# Patient Record
Sex: MALE | Race: BLACK OR AFRICAN AMERICAN | NOT HISPANIC OR LATINO | Employment: FULL TIME | URBAN - METROPOLITAN AREA
[De-identification: names, ages, dates, MRNs, and addresses within clinical notes are randomized per-mention and may not be internally consistent; named-entity substitution may affect disease eponyms.]

---

## 2021-03-15 ENCOUNTER — OFFICE VISIT (OUTPATIENT)
Dept: URGENT CARE | Facility: CLINIC | Age: 26
End: 2021-03-15
Payer: MEDICAID

## 2021-03-15 ENCOUNTER — CLINICAL SUPPORT (OUTPATIENT)
Dept: URGENT CARE | Facility: CLINIC | Age: 26
End: 2021-03-15
Payer: MEDICAID

## 2021-03-15 VITALS
BODY MASS INDEX: 22.53 KG/M2 | HEART RATE: 71 BPM | TEMPERATURE: 99 F | DIASTOLIC BLOOD PRESSURE: 53 MMHG | RESPIRATION RATE: 16 BRPM | HEIGHT: 73 IN | SYSTOLIC BLOOD PRESSURE: 127 MMHG | WEIGHT: 170 LBS | OXYGEN SATURATION: 96 %

## 2021-03-15 DIAGNOSIS — K52.9 GASTROENTERITIS: Primary | ICD-10-CM

## 2021-03-15 DIAGNOSIS — R11.2 NON-INTRACTABLE VOMITING WITH NAUSEA, UNSPECIFIED VOMITING TYPE: ICD-10-CM

## 2021-03-15 DIAGNOSIS — Z11.9 ENCOUNTER FOR SCREENING EXAMINATION FOR INFECTIOUS DISEASE: Primary | ICD-10-CM

## 2021-03-15 DIAGNOSIS — R19.7 DIARRHEA, UNSPECIFIED TYPE: ICD-10-CM

## 2021-03-15 LAB
CTP QC/QA: YES
SARS-COV-2 RDRP RESP QL NAA+PROBE: NEGATIVE

## 2021-03-15 PROCEDURE — U0002 COVID-19 LAB TEST NON-CDC: HCPCS | Mod: QW,S$GLB,, | Performed by: PHYSICIAN ASSISTANT

## 2021-03-15 PROCEDURE — 99203 PR OFFICE/OUTPT VISIT, NEW, LEVL III, 30-44 MIN: ICD-10-PCS | Mod: S$GLB,,, | Performed by: PHYSICIAN ASSISTANT

## 2021-03-15 PROCEDURE — U0002: ICD-10-PCS | Mod: QW,S$GLB,, | Performed by: PHYSICIAN ASSISTANT

## 2021-03-15 PROCEDURE — 99203 OFFICE O/P NEW LOW 30 MIN: CPT | Mod: S$GLB,,, | Performed by: PHYSICIAN ASSISTANT

## 2021-04-16 ENCOUNTER — PATIENT MESSAGE (OUTPATIENT)
Dept: RESEARCH | Facility: HOSPITAL | Age: 26
End: 2021-04-16

## 2021-07-01 ENCOUNTER — PATIENT MESSAGE (OUTPATIENT)
Dept: ADMINISTRATIVE | Facility: OTHER | Age: 26
End: 2021-07-01

## 2021-12-08 ENCOUNTER — OFFICE VISIT (OUTPATIENT)
Dept: URGENT CARE | Facility: CLINIC | Age: 26
End: 2021-12-08
Payer: COMMERCIAL

## 2021-12-08 VITALS
RESPIRATION RATE: 20 BRPM | WEIGHT: 170 LBS | HEART RATE: 81 BPM | TEMPERATURE: 99 F | BODY MASS INDEX: 22.53 KG/M2 | SYSTOLIC BLOOD PRESSURE: 145 MMHG | HEIGHT: 73 IN | OXYGEN SATURATION: 98 % | DIASTOLIC BLOOD PRESSURE: 87 MMHG

## 2021-12-08 DIAGNOSIS — J02.9 SORE THROAT: Primary | ICD-10-CM

## 2021-12-08 DIAGNOSIS — B34.9 VIRAL SYNDROME: ICD-10-CM

## 2021-12-08 LAB
CTP QC/QA: YES
CTP QC/QA: YES
MOLECULAR STREP A: NEGATIVE
POC MOLECULAR INFLUENZA A AGN: NEGATIVE
POC MOLECULAR INFLUENZA B AGN: NEGATIVE

## 2021-12-08 PROCEDURE — 99213 PR OFFICE/OUTPT VISIT, EST, LEVL III, 20-29 MIN: ICD-10-PCS | Mod: S$GLB,,, | Performed by: NURSE PRACTITIONER

## 2021-12-08 PROCEDURE — 87651 POCT STREP A MOLECULAR: ICD-10-PCS | Mod: QW,S$GLB,, | Performed by: NURSE PRACTITIONER

## 2021-12-08 PROCEDURE — 87502 INFLUENZA DNA AMP PROBE: CPT | Mod: QW,S$GLB,, | Performed by: NURSE PRACTITIONER

## 2021-12-08 PROCEDURE — 87502 POCT INFLUENZA A/B MOLECULAR: ICD-10-PCS | Mod: QW,S$GLB,, | Performed by: NURSE PRACTITIONER

## 2021-12-08 PROCEDURE — 99213 OFFICE O/P EST LOW 20 MIN: CPT | Mod: S$GLB,,, | Performed by: NURSE PRACTITIONER

## 2021-12-08 PROCEDURE — 87651 STREP A DNA AMP PROBE: CPT | Mod: QW,S$GLB,, | Performed by: NURSE PRACTITIONER

## 2022-01-01 ENCOUNTER — OFFICE VISIT (OUTPATIENT)
Dept: URGENT CARE | Facility: CLINIC | Age: 27
End: 2022-01-01
Payer: COMMERCIAL

## 2022-01-01 VITALS
HEART RATE: 62 BPM | HEIGHT: 73 IN | DIASTOLIC BLOOD PRESSURE: 73 MMHG | WEIGHT: 170 LBS | OXYGEN SATURATION: 98 % | BODY MASS INDEX: 22.53 KG/M2 | TEMPERATURE: 99 F | RESPIRATION RATE: 18 BRPM | SYSTOLIC BLOOD PRESSURE: 137 MMHG

## 2022-01-01 DIAGNOSIS — U07.1 COVID-19: Primary | ICD-10-CM

## 2022-01-01 DIAGNOSIS — R50.9 FEVER, UNSPECIFIED FEVER CAUSE: ICD-10-CM

## 2022-01-01 LAB
CTP QC/QA: YES
SARS-COV-2 RDRP RESP QL NAA+PROBE: POSITIVE

## 2022-01-01 PROCEDURE — 3075F SYST BP GE 130 - 139MM HG: CPT | Mod: CPTII,S$GLB,,

## 2022-01-01 PROCEDURE — 99213 OFFICE O/P EST LOW 20 MIN: CPT | Mod: S$GLB,,,

## 2022-01-01 PROCEDURE — 3008F PR BODY MASS INDEX (BMI) DOCUMENTED: ICD-10-PCS | Mod: CPTII,S$GLB,,

## 2022-01-01 PROCEDURE — 3078F DIAST BP <80 MM HG: CPT | Mod: CPTII,S$GLB,,

## 2022-01-01 PROCEDURE — 3008F BODY MASS INDEX DOCD: CPT | Mod: CPTII,S$GLB,,

## 2022-01-01 PROCEDURE — 3078F PR MOST RECENT DIASTOLIC BLOOD PRESSURE < 80 MM HG: ICD-10-PCS | Mod: CPTII,S$GLB,,

## 2022-01-01 PROCEDURE — U0002 COVID-19 LAB TEST NON-CDC: HCPCS | Mod: QW,S$GLB,,

## 2022-01-01 PROCEDURE — 1159F PR MEDICATION LIST DOCUMENTED IN MEDICAL RECORD: ICD-10-PCS | Mod: CPTII,S$GLB,,

## 2022-01-01 PROCEDURE — 3075F PR MOST RECENT SYSTOLIC BLOOD PRESS GE 130-139MM HG: ICD-10-PCS | Mod: CPTII,S$GLB,,

## 2022-01-01 PROCEDURE — 1160F PR REVIEW ALL MEDS BY PRESCRIBER/CLIN PHARMACIST DOCUMENTED: ICD-10-PCS | Mod: CPTII,S$GLB,,

## 2022-01-01 PROCEDURE — U0002: ICD-10-PCS | Mod: QW,S$GLB,,

## 2022-01-01 PROCEDURE — 99213 PR OFFICE/OUTPT VISIT, EST, LEVL III, 20-29 MIN: ICD-10-PCS | Mod: S$GLB,,,

## 2022-01-01 PROCEDURE — 1160F RVW MEDS BY RX/DR IN RCRD: CPT | Mod: CPTII,S$GLB,,

## 2022-01-01 PROCEDURE — 1159F MED LIST DOCD IN RCRD: CPT | Mod: CPTII,S$GLB,,

## 2022-01-01 NOTE — LETTER
[address], [address] ? [place] [address] ? Phone [phone] ? Fax [phone]             Return to Work/School    Patient: Dc Taylor  YOB: 1995  Date: 01/01/2022        To Whom It May Concern:     Dc Taylor was in contact with/seen in my office on 01/01/2022 . COVID-19 is present in our communities across the state. Not all patients are eligible or appropriate to be tested. In this situation, your student/employee meets the following criteria:     Dc Taylor has met the criteria for COVID-19 testing and has a POSITIVE result. he can return to school/work once they are asymptomatic for 24 hours without the use of fever reducing medications AND at least 5 days from the start of symptoms (or from the first positive result if they have no symptoms).  They must mask for 10 days total.     If you have any questions or concerns, or if I can be of further assistance, please do not hesitate to contact me.     Sincerely,        Mago Minaya PA-C

## 2022-01-01 NOTE — PATIENT INSTRUCTIONS
You have tested positive for COVID-19 today.      ISOLATION  If you tested positive and do not have symptoms, you must isolate for 5 days starting on the day of the positive test. I    If you tested positive and have symptoms, you must isolate for 5 days starting on the day of the first symptoms,  not the day of the positive test.     This is the most important part, both the CDC and the [facility] emphasize that you do not test out of isolation.     After 5 days, if your symptoms have improved and you have not had fever on day 5, you can return to the community on day 6- NO TESTING REQUIRED!      In fact, we do not retest if you were positive in the last 90 days.    After your 5 days of isolation are completed, the CDC recommends strict mask use for the first 5 days that you come out of isolation.    Please drink plenty of fluids.  Please get plenty of rest.  If you do not have Hypertension or any history of palpitations, it is ok to take over the counter Sudafed or Mucinex D or Allegra-D or Claritin-D or Zyrtec-D.  If you do take one of the above, it is ok to combine that with plain over the counter Mucinex or Allegra or Claritin or Zyrtec.  If for example you are taking Zyrtec -D, you can combine that with Mucinex, but not Mucinex-D.  If you are taking Mucinex-D, you can combine that with plain Allegra or Claritin or Zyrtec.   Flonase for nasal congestion    Sore throat:  -Tea w/honey  -Throat lozenges  -Warm liquids (I.e. broth)  -Take any medications given as prescribed    If not allergic, please take over the counter Tylenol (Acetaminophen) and/or Motrin (Ibuprofen) as directed for control of pain and/or fever. Avoid tylenol if you have a history of liver disease. Do not take ibuprofen if you have a history of GI bleeding, kidney disease, or if you take blood thinners.   Please follow up with your primary care doctor or specialist as needed.    If you  smoke, please stop smoking.    - You must understand that you  have received an Urgent Care treatment only and that you may be released before all of your medical problems are known or treated.   - You, the patient, will arrange for follow up care as instructed.   - If your condition worsens or fails to improve we recommend that you receive another evaluation at the ER immediately or contact your PCP to discuss your concerns or return here.   - Follow up with your PCP or specialty clinic as directed in the next 1-2 weeks if not improved or as needed.  You can call [phone] to schedule an appointment with the appropriate provider.    Patient Education       COVID-19 After You Have Been Vaccinated   About this topic   Coronavirus disease 2019 or COVID-19 is a virus that spreads easily from person to person. In 2020, there were a few kinds of vaccines developed to help prevent COVID-19. You are fully vaccinated when it has been more than 2 weeks since you were given the second dose of a 2-dose series of shots or more than 2 weeks since you were given a single dose vaccine. Until that time, it is important that you keep up with your normal safety measures.  When you are fully vaccinated, doctors now feel it is safe for:  · You to go back to your normal activities without wearing a mask or social distancing. You may still be required to wear a mask sometimes, based on local guidelines and the number of local COVID cases. If you are around others who are at a higher risk for serious illness from COVID-19, you should all still wear masks.  · You to travel. You do not need to be tested before or after travel. You also do not need to self-quarantine after travel. Where you are going may still have rules for testing.  If you have a weak immune system, you may not be fully protected from COVID-19, even if you are fully vaccinated. Continue to take all the precautions you would if you had not received a vaccine. This includes wearing a well-fitted mask over your nose and mouth and  social distancing.  If you have been around someone with COVID-19, get tested 3 to 5 days after your exposure, even if you dont have symptoms. Wear a mask in public for 14 days or until you have a negative COVID-19 test.  General   Many people want life to go back to normal after they have gotten a COVID-19 vaccine. Sadly, that is not the case.  Why do I still need to be careful if I have had all the doses of the vaccine?   Your body takes time to build up immunity to the virus. This means you are not fully protected right after your first or second shot. Most of the time, it takes your body a week or 2 after the last dose to become protected.  New strains of the virus are found all the time. Doctors do not know for sure how a COVID vaccine will work against new strains. The vaccine you received may not work against the new strain. Also, doctors dont know how long protection from a COVID-19 vaccine will last.  You may be able to get a booster dose of the vaccine some time after you are fully vaccinated. This is an extra shot given to help improve your immunity to the virus. Talk with your doctor about if you need a booster.  Vaccines work best when most of the people in a country have gotten them. Then the risk for getting the disease to the whole country goes down. All people are safer when you get a vaccine.  Can I spend time with my family and friends?   Doctors feel it is safe to go back to indoor and outdoor activities. You are at a much lower risk of spreading COVID-19 after you have been fully vaccinated. You may want to wear a mask indoors in public if you are in an area where there are a lot of cases of COVID-19.  Do I need to quarantine if I am exposed to someone with COVID-19?   You do not need to quarantine if:  · You are fully vaccinated and   · You have not had any symptoms of COVID-19 since you were exposed.  If you have not completed all the shots in your vaccine series, you should quarantine at  home for 14 days. Also quarantine at home if you have symptoms of COVID-19.  If you are exposed to COVID-19, get tested 3 to 5 days after you are exposed. Watch for symptoms for 14 days, even if you are fully vaccinated. Wear a mask in public for 14 days or until you have a negative COVID-19 test.  Can I go out to eat, or to a concert, or sporting event?   When you are fully vaccinated, doctors feel it is safe for you to:  · Go out to eat, either inside or outside.  · Go to crowded events like sporting events, [employer], or concerts.  · Exercise indoors or outdoors.  · Go to a movie theater.  · Gather with friends and family.  What about travel?   Doctors feel it is safe for you to travel when you are fully vaccinated. In most cases, you do not need to quarantine or have a COVID-19 test before or after your travel. Some places may still want you to test before you travel. It is important to remember that new strains of the COVID-19 virus are developing all around the world. This means, in some places, there are more limits around travel, quarantine, and testing. If you travel, do your best to avoid crowds, wear a mask as recommended and wash your hands often.  What should I do if I am in an area where there are a lot of COVID-19 cases?   You may want to wear a mask over your nose and mouth if you are in a crowded area or are around people who have not had a vaccine. Also, wash your hands often and practice social distancing.  What should I do now?   Continue to help protect yourself and others.  · Wear a cloth face mask over your nose and mouth if you are required to by local guidelines.  · Wash your hands often with soap and water for at least 20 seconds, especially after you cough or sneeze. Use alcohol-based hand sanitizers with at least 60 percent alcohol if soap and water are not available. Rub your hands with the  for at least 20 seconds.  Where can I learn more?   Centers for Disease Control and  Prevention  https://www.cdc.gov/coronavirus/2019-ncov/daily-life-coping/participate-in-activities.html   Centers for Disease Control and Prevention  https://www.cdc.gov/coronavirus/2019-ncov/vaccines/fully-vaccinated.html   Last Reviewed Date   2021-09-24  Consumer Information Use and Disclaimer   This information is not specific medical advice and does not replace information you receive from your health care provider. This is only a brief summary of general information. It does NOT include all information about conditions, illnesses, injuries, tests, procedures, treatments, therapies, discharge instructions or life-style choices that may apply to you. You must talk with your health care provider for complete information about your health and treatment options. This information should not be used to decide whether or not to accept your health care providers advice, instructions or recommendations. Only your health care provider has the knowledge and training to provide advice that is right for you.  Copyright   Copyright © 2021 [employer] and its affiliates and/or licensors. All rights reserved.

## 2022-01-01 NOTE — PROGRESS NOTES
"Subjective:       Patient ID: Dc Taylor is a 26 y.o. male.    Vitals:  height is 6' 1" (1.854 m) and weight is 77.1 kg (170 lb). His temperature is 98.5 °F (36.9 °C). His blood pressure is 137/73 and his pulse is 62. His respiration is 18 and oxygen saturation is 98%.     Chief Complaint: Fever    Patient stated that he thinks he had a fever yesterday . Pt also reports to having nasal congestion , Pt is fully vaccinated , Pts pharmacy updated .       Provider note starts below:  Patient presents with symptoms of congestion and reports of fever yesterday.  He denies any chest pain, chills, nausea, vomiting, sinus pain or pressure.  He has been taking Tylenol and BC Powder for symptoms.  Patient is fully vaccinated against COVID-19.    Fever   This is a new problem. The current episode started yesterday. The problem occurs daily. The problem has been gradually improving. His temperature was unmeasured prior to arrival. Associated symptoms include congestion. Pertinent negatives include no abdominal pain, chest pain, coughing, diarrhea, ear pain, headaches, muscle aches, nausea, rash, sleepiness, sore throat, urinary pain, vomiting or wheezing. He has tried nothing for the symptoms. The treatment provided no relief.   Risk factors: no contaminated food, no contaminated water, no hx of cancer, no immunosuppression, no occupational exposure, no recent sickness, no recent travel and no sick contacts        Constitution: Positive for fever. Negative for chills and fatigue.   HENT: Positive for congestion. Negative for ear pain, ear discharge, sinus pain, sinus pressure and sore throat.    Cardiovascular: Negative for chest pain.   Respiratory: Negative for cough, shortness of breath and wheezing.    Gastrointestinal: Negative for abdominal pain, nausea, vomiting and diarrhea.   Genitourinary: Negative for dysuria.   Skin: Negative for rash.   Neurological: Negative for dizziness, light-headedness and headaches.     "   Objective:      Physical Exam   Constitutional: He is oriented to person, place, and time. He appears well-developed and well-nourished. He is cooperative.  Non-toxic appearance. He does not have a sickly appearance. He does not appear ill. No distress.   HENT:   Head: Normocephalic and atraumatic.   Ears:   Right Ear: Hearing, tympanic membrane, external ear and ear canal normal.   Left Ear: Hearing, tympanic membrane, external ear and ear canal normal.   Nose: Nose normal. No mucosal edema, rhinorrhea or nasal deformity. No epistaxis. Right sinus exhibits no maxillary sinus tenderness and no frontal sinus tenderness. Left sinus exhibits no maxillary sinus tenderness and no frontal sinus tenderness.   Mouth/Throat: Uvula is midline, oropharynx is clear and moist and mucous membranes are normal. No trismus in the jaw. Normal dentition. No uvula swelling. No oropharyngeal exudate, posterior oropharyngeal edema or posterior oropharyngeal erythema.   Eyes: Conjunctivae and lids are normal. No scleral icterus.   Neck: Neck supple. No edema present. No erythema present. No neck rigidity present.   Cardiovascular: Normal rate, regular rhythm, normal heart sounds, intact distal pulses and normal pulses.   Pulmonary/Chest: Effort normal and breath sounds normal. No respiratory distress. He has no decreased breath sounds. He has no rhonchi.   Abdominal: Normal appearance.   Musculoskeletal: Normal range of motion.         General: No deformity or edema. Normal range of motion.   Lymphadenopathy:     He has no cervical adenopathy.   Neurological: He is alert and oriented to person, place, and time. He exhibits normal muscle tone. Coordination normal.   Skin: Skin is warm, dry, intact, not diaphoretic and not pale.   Psychiatric: He has a normal mood and affect. His speech is normal and behavior is normal. Judgment and thought content normal. Cognition and memory  Nursing note and vitals reviewed.    Results for orders  placed or performed in visit on 01/01/22   POCT COVID-19 Rapid Screening   Result Value Ref Range    POC Rapid COVID Positive (A) Negative     Acceptable Yes            Assessment:       1. COVID-19    2. Fever, unspecified fever cause          Plan:     labs reviewed patient.    COVID-19    Fever, unspecified fever cause  -     POCT COVID-19 Rapid Screening      Patient Instructions   You have tested positive for COVID-19 today.      ISOLATION  If you tested positive and do not have symptoms, you must isolate for 5 days starting on the day of the positive test. I    If you tested positive and have symptoms, you must isolate for 5 days starting on the day of the first symptoms,  not the day of the positive test.     This is the most important part, both the CDC and the [facility] emphasize that you do not test out of isolation.     After 5 days, if your symptoms have improved and you have not had fever on day 5, you can return to the community on day 6- NO TESTING REQUIRED!      In fact, we do not retest if you were positive in the last 90 days.    After your 5 days of isolation are completed, the CDC recommends strict mask use for the first 5 days that you come out of isolation.    Please drink plenty of fluids.  Please get plenty of rest.  If you do not have Hypertension or any history of palpitations, it is ok to take over the counter Sudafed or Mucinex D or Allegra-D or Claritin-D or Zyrtec-D.  If you do take one of the above, it is ok to combine that with plain over the counter Mucinex or Allegra or Claritin or Zyrtec.  If for example you are taking Zyrtec -D, you can combine that with Mucinex, but not Mucinex-D.  If you are taking Mucinex-D, you can combine that with plain Allegra or Claritin or Zyrtec.   Flonase for nasal congestion    Sore throat:  -Tea w/honey  -Throat lozenges  -Warm liquids (I.e. broth)  -Take any medications given as prescribed    If not allergic, please take over the counter  Tylenol (Acetaminophen) and/or Motrin (Ibuprofen) as directed for control of pain and/or fever. Avoid tylenol if you have a history of liver disease. Do not take ibuprofen if you have a history of GI bleeding, kidney disease, or if you take blood thinners.   Please follow up with your primary care doctor or specialist as needed.    If you  smoke, please stop smoking.    - You must understand that you have received an Urgent Care treatment only and that you may be released before all of your medical problems are known or treated.   - You, the patient, will arrange for follow up care as instructed.   - If your condition worsens or fails to improve we recommend that you receive another evaluation at the ER immediately or contact your PCP to discuss your concerns or return here.   - Follow up with your PCP or specialty clinic as directed in the next 1-2 weeks if not improved or as needed.  You can call [phone] to schedule an appointment with the appropriate provider.    Patient Education       COVID-19 After You Have Been Vaccinated   About this topic   Coronavirus disease 2019 or COVID-19 is a virus that spreads easily from person to person. In 2020, there were a few kinds of vaccines developed to help prevent COVID-19. You are fully vaccinated when it has been more than 2 weeks since you were given the second dose of a 2-dose series of shots or more than 2 weeks since you were given a single dose vaccine. Until that time, it is important that you keep up with your normal safety measures.  When you are fully vaccinated, doctors now feel it is safe for:  · You to go back to your normal activities without wearing a mask or social distancing. You may still be required to wear a mask sometimes, based on local guidelines and the number of local COVID cases. If you are around others who are at a higher risk for serious illness from COVID-19, you should all still wear masks.  · You to travel. You do not need to be  tested before or after travel. You also do not need to self-quarantine after travel. Where you are going may still have rules for testing.  If you have a weak immune system, you may not be fully protected from COVID-19, even if you are fully vaccinated. Continue to take all the precautions you would if you had not received a vaccine. This includes wearing a well-fitted mask over your nose and mouth and social distancing.  If you have been around someone with COVID-19, get tested 3 to 5 days after your exposure, even if you dont have symptoms. Wear a mask in public for 14 days or until you have a negative COVID-19 test.  General   Many people want life to go back to normal after they have gotten a COVID-19 vaccine. Sadly, that is not the case.  Why do I still need to be careful if I have had all the doses of the vaccine?   Your body takes time to build up immunity to the virus. This means you are not fully protected right after your first or second shot. Most of the time, it takes your body a week or 2 after the last dose to become protected.  New strains of the virus are found all the time. Doctors do not know for sure how a COVID vaccine will work against new strains. The vaccine you received may not work against the new strain. Also, doctors dont know how long protection from a COVID-19 vaccine will last.  You may be able to get a booster dose of the vaccine some time after you are fully vaccinated. This is an extra shot given to help improve your immunity to the virus. Talk with your doctor about if you need a booster.  Vaccines work best when most of the people in a country have gotten them. Then the risk for getting the disease to the whole country goes down. All people are safer when you get a vaccine.  Can I spend time with my family and friends?   Doctors feel it is safe to go back to indoor and outdoor activities. You are at a much lower risk of spreading COVID-19 after you have been fully vaccinated. You  may want to wear a mask indoors in public if you are in an area where there are a lot of cases of COVID-19.  Do I need to quarantine if I am exposed to someone with COVID-19?   You do not need to quarantine if:  · You are fully vaccinated and   · You have not had any symptoms of COVID-19 since you were exposed.  If you have not completed all the shots in your vaccine series, you should quarantine at home for 14 days. Also quarantine at home if you have symptoms of COVID-19.  If you are exposed to COVID-19, get tested 3 to 5 days after you are exposed. Watch for symptoms for 14 days, even if you are fully vaccinated. Wear a mask in public for 14 days or until you have a negative COVID-19 test.  Can I go out to eat, or to a concert, or sporting event?   When you are fully vaccinated, doctors feel it is safe for you to:  · Go out to eat, either inside or outside.  · Go to crowded events like sporting events, [employer], or concerts.  · Exercise indoors or outdoors.  · Go to a movie theater.  · Gather with friends and family.  What about travel?   Doctors feel it is safe for you to travel when you are fully vaccinated. In most cases, you do not need to quarantine or have a COVID-19 test before or after your travel. Some places may still want you to test before you travel. It is important to remember that new strains of the COVID-19 virus are developing all around the world. This means, in some places, there are more limits around travel, quarantine, and testing. If you travel, do your best to avoid crowds, wear a mask as recommended and wash your hands often.  What should I do if I am in an area where there are a lot of COVID-19 cases?   You may want to wear a mask over your nose and mouth if you are in a crowded area or are around people who have not had a vaccine. Also, wash your hands often and practice social distancing.  What should I do now?   Continue to help protect yourself and others.  · Wear a cloth face mask  over your nose and mouth if you are required to by local guidelines.  · Wash your hands often with soap and water for at least 20 seconds, especially after you cough or sneeze. Use alcohol-based hand sanitizers with at least 60 percent alcohol if soap and water are not available. Rub your hands with the  for at least 20 seconds.  Where can I learn more?   Centers for Disease Control and Prevention  https://www.cdc.gov/coronavirus/2019-ncov/daily-life-coping/participate-in-activities.html   Centers for Disease Control and Prevention  https://www.cdc.gov/coronavirus/2019-ncov/vaccines/fully-vaccinated.html   Last Reviewed Date   2021-09-24  Consumer Information Use and Disclaimer   This information is not specific medical advice and does not replace information you receive from your health care provider. This is only a brief summary of general information. It does NOT include all information about conditions, illnesses, injuries, tests, procedures, treatments, therapies, discharge instructions or life-style choices that may apply to you. You must talk with your health care provider for complete information about your health and treatment options. This information should not be used to decide whether or not to accept your health care providers advice, instructions or recommendations. Only your health care provider has the knowledge and training to provide advice that is right for you.  Copyright   Copyright © 2021 [employer] and its affiliates and/or licensors. All rights reserved.

## 2022-02-08 ENCOUNTER — OFFICE VISIT (OUTPATIENT)
Dept: URGENT CARE | Facility: CLINIC | Age: 27
End: 2022-02-08
Payer: MEDICAID

## 2022-02-08 VITALS
SYSTOLIC BLOOD PRESSURE: 150 MMHG | WEIGHT: 170 LBS | DIASTOLIC BLOOD PRESSURE: 84 MMHG | HEART RATE: 72 BPM | TEMPERATURE: 98 F | OXYGEN SATURATION: 99 % | BODY MASS INDEX: 22.53 KG/M2 | RESPIRATION RATE: 18 BRPM | HEIGHT: 73 IN

## 2022-02-08 DIAGNOSIS — R51.9 SINUS HEADACHE: Primary | ICD-10-CM

## 2022-02-08 PROCEDURE — 3079F PR MOST RECENT DIASTOLIC BLOOD PRESSURE 80-89 MM HG: ICD-10-PCS | Mod: CPTII,S$GLB,, | Performed by: NURSE PRACTITIONER

## 2022-02-08 PROCEDURE — 1159F PR MEDICATION LIST DOCUMENTED IN MEDICAL RECORD: ICD-10-PCS | Mod: CPTII,S$GLB,, | Performed by: NURSE PRACTITIONER

## 2022-02-08 PROCEDURE — 99213 PR OFFICE/OUTPT VISIT, EST, LEVL III, 20-29 MIN: ICD-10-PCS | Mod: S$GLB,,, | Performed by: NURSE PRACTITIONER

## 2022-02-08 PROCEDURE — 3079F DIAST BP 80-89 MM HG: CPT | Mod: CPTII,S$GLB,, | Performed by: NURSE PRACTITIONER

## 2022-02-08 PROCEDURE — 1160F PR REVIEW ALL MEDS BY PRESCRIBER/CLIN PHARMACIST DOCUMENTED: ICD-10-PCS | Mod: CPTII,S$GLB,, | Performed by: NURSE PRACTITIONER

## 2022-02-08 PROCEDURE — 1159F MED LIST DOCD IN RCRD: CPT | Mod: CPTII,S$GLB,, | Performed by: NURSE PRACTITIONER

## 2022-02-08 PROCEDURE — 3008F PR BODY MASS INDEX (BMI) DOCUMENTED: ICD-10-PCS | Mod: CPTII,S$GLB,, | Performed by: NURSE PRACTITIONER

## 2022-02-08 PROCEDURE — 3077F SYST BP >= 140 MM HG: CPT | Mod: CPTII,S$GLB,, | Performed by: NURSE PRACTITIONER

## 2022-02-08 PROCEDURE — 3077F PR MOST RECENT SYSTOLIC BLOOD PRESSURE >= 140 MM HG: ICD-10-PCS | Mod: CPTII,S$GLB,, | Performed by: NURSE PRACTITIONER

## 2022-02-08 PROCEDURE — 99213 OFFICE O/P EST LOW 20 MIN: CPT | Mod: S$GLB,,, | Performed by: NURSE PRACTITIONER

## 2022-02-08 PROCEDURE — 3008F BODY MASS INDEX DOCD: CPT | Mod: CPTII,S$GLB,, | Performed by: NURSE PRACTITIONER

## 2022-02-08 PROCEDURE — 1160F RVW MEDS BY RX/DR IN RCRD: CPT | Mod: CPTII,S$GLB,, | Performed by: NURSE PRACTITIONER

## 2022-02-08 RX ORDER — FLUTICASONE PROPIONATE 50 MCG
1 SPRAY, SUSPENSION (ML) NASAL DAILY
Qty: 15.8 ML | Refills: 0 | Status: SHIPPED | OUTPATIENT
Start: 2022-02-08

## 2022-02-08 RX ORDER — AZELASTINE 1 MG/ML
1 SPRAY, METERED NASAL 2 TIMES DAILY
Qty: 30 ML | Refills: 1 | Status: SHIPPED | OUTPATIENT
Start: 2022-02-08

## 2022-02-08 NOTE — PATIENT INSTRUCTIONS
Return to Urgent Care or go to ER if symptoms worsen or fail to improve.  Follow up with PCP as recommended for further management.     THE FOLLOWING ARE RECOMMENDED TO HELP YOU MANAGE YOUR SYMPTOMS:    Take Advil Allergy and Sinus (behind pharmacy counter) according to label instructions as needed for congestion, cough and body aches.  This medication contains ibuprofen, an antihistamine--chlorpheniramine, and sudafed, a decongestant.  You should not take this medication if you have high blood pressure or any heart issues.    Take Ibuprofen or Acetaminophen according to label instructions for fever, throat pain, headache, and body aches    Use warm salt water gargles to ease your throat pain. Warm salt water gargles as needed for sore throat-  1/2 tsp salt to 1 cup warm water, gargle as desired.    Patient Education       Sinus Headache Discharge Instructions   About this topic   A sinus headache is caused by infected sinuses. Sinuses are small air spaces in the head behind the nose, eyes, and cheeks. They lead into the nose. They allow fluid, called mucus, to drain. They can swell due to an allergy or an infection. When they swell, fluid gets trapped and it can get infected. This causes pressure in your head. If the pressure is very bad you will feel pain. If you move suddenly or lean forward the pain often gets worse. The pain is dull and throbs. You may feel it in the top of your face. You may also ache and have a yellow-green drainage from your nose. Your doctor may order drugs to stop the infection and to help with swelling.           What care is needed at home?   · Ask your doctor what you need to do when you go home. Make sure you ask questions if you do not understand what the doctor says. This way you will know what you need to do.  · Use a salt water or saline rinse in your nose. Talk to your doctor about how often you should do this.  · Hold a warm cloth to your sinuses for a few minutes. Then, hold a  cold cloth to your sinuses for 30 seconds. Repeat a few times a day.  · Take a hot bath or shower. Breathe in steam from a bowl of hot water or hot shower. This moist air can help the headache.  · Drink 6 to 8 glasses of water each day.  · Avoid beer, wine, and mixed drinks (alcohol). This can make the nose and sinuses swell.  · Do not smoke and avoid areas where people smoke.  What follow-up care is needed?   Your doctor may ask you to make visits to the office to check on your progress. Be sure to keep these visits.  What drugs may be needed?   The doctor may order drugs to:  · Relieve headache  · Help with pain and swelling  · Treat an allergy  · Moisten your nose  · Stop an infection  Will physical activity be limited?   You may be more comfortable if you do not lean forward or lie down. Try to sleep with your head and shoulders propped on a few pillows. Talk to your doctor about the right amount of activity for you.  What can be done to prevent this health problem?   · Avoid fumes, smoke, dust, and other allergens. These can bring on your headache.  · Look for treatment if you have a cold or an infection. This can keep you from getting sinusitis.  When do I need to call the doctor?   · Signs of infection. These include a fever of 100.4°F (38°C) or higher, chills, very bad sore throat, ear or sinus pain, cough, more sputum or change in color of sputum.  · Sinus headache lasts for more than a week  · The drugs your doctor gave you do not work  · You are not feeling better in 2 to 3 days or you are feeling worse  Teach Back: Helping You Understand   The Teach Back Method helps you understand the information we are giving you. The idea is simple. After talking with the staff, tell them in your own words what you were just told. This helps to make sure the staff has covered each thing clearly. It also helps to explain things that may have been a bit confusing. Before going home, make sure you are able to do  these:  · I can tell you about my condition.  · I can tell you what may help ease my pain.  · I can tell you what I will do if my headache lasts for more than a week.  Where can I learn more?   [employer]  https://americanmigrainefoundation.org/understanding-migraine/sinus-headaches/   [facility] Choices  http://www.nhs.uk/conditions/sinus-headache/Pages/Introduction.aspx   Last Reviewed Date   2018-10-19  Consumer Information Use and Disclaimer   This information is not specific medical advice and does not replace information you receive from your health care provider. This is only a brief summary of general information. It does NOT include all information about conditions, illnesses, injuries, tests, procedures, treatments, therapies, discharge instructions or life-style choices that may apply to you. You must talk with your health care provider for complete information about your health and treatment options. This information should not be used to decide whether or not to accept your health care providers advice, instructions or recommendations. Only your health care provider has the knowledge and training to provide advice that is right for you.  Copyright   Copyright © 2021 [employer] and its affiliates and/or licensors. All rights reserved.

## 2022-02-08 NOTE — PROGRESS NOTES
"Subjective:       Patient ID: Dc Taylor is a 26 y.o. male.    Vitals:  height is 6' 1" (1.854 m) and weight is 77.1 kg (170 lb). His temperature is 97.8 °F (36.6 °C). His blood pressure is 150/84 (abnormal) and his pulse is 72. His respiration is 18 and oxygen saturation is 99%.     Chief Complaint: Headache    Patient stated his symptoms started 3 days ago.  Tested positive for covid-19 about 5 weeks ago and he cant get rid of his headache. He is vaccinated.    Headache   This is a new problem. The current episode started in the past 7 days. The problem has been unchanged. Radiates to: eyes  The pain is at a severity of 7/10. The pain is moderate. Associated symptoms include eye pain and sinus pressure. Pertinent negatives include no coughing, dizziness, eye redness, eye watering, fever, sore throat or tingling. Treatments tried: tylenol. The treatment provided mild relief. There is no history of cancer or hypertension.       Constitution: Negative for fever.   HENT: Positive for sinus pressure. Negative for sore throat.    Eyes: Positive for eye pain. Negative for eye redness.   Respiratory: Negative for cough.    Neurological: Positive for headaches. Negative for dizziness.       Objective:      Physical Exam   Constitutional: He is oriented to person, place, and time.  Non-toxic appearance. He does not appear ill. No distress.   HENT:   Head: Normocephalic and atraumatic.   Nose: Mucosal edema and congestion present. No rhinorrhea or purulent discharge. Right sinus exhibits no maxillary sinus tenderness and no frontal sinus tenderness. Left sinus exhibits no maxillary sinus tenderness and no frontal sinus tenderness.   Mouth/Throat: Uvula is midline, oropharynx is clear and moist and mucous membranes are normal. Mucous membranes are moist. No uvula swelling. No oropharyngeal exudate, posterior oropharyngeal edema or posterior oropharyngeal erythema.   Eyes: Conjunctivae are normal.      extraocular movement " intact   Pulmonary/Chest: Effort normal. No respiratory distress.   Speaks in complete sentences without pause    Comments: Speaks in complete sentences without pause and Speaks in complete sentences without pause    Neurological: He is alert and oriented to person, place, and time.   Skin: Skin is not diaphoretic and no rash.   Psychiatric: His behavior is normal.   Nursing note and vitals reviewed.        Assessment:       1. Sinus headache          Plan:         Sinus headache  -     fluticasone propionate (FLONASE) 50 mcg/actuation nasal spray; 1 spray (50 mcg total) by Each Nostril route once daily.  Dispense: 15.8 mL; Refill: 0  -     azelastine (ASTELIN) 137 mcg (0.1 %) nasal spray; 1 spray (137 mcg total) by Nasal route 2 (two) times daily.  Dispense: 30 mL; Refill: 1

## 2023-06-09 DIAGNOSIS — K40.90 UNILATERAL INGUINAL HERNIA WITHOUT OBSTRUCTION OR GANGRENE: Primary | ICD-10-CM

## 2023-06-19 ENCOUNTER — HOSPITAL ENCOUNTER (OUTPATIENT)
Dept: RADIOLOGY | Facility: HOSPITAL | Age: 28
Discharge: HOME | End: 2023-06-19
Payer: MEDICAID

## 2023-06-19 DIAGNOSIS — K40.90 UNILATERAL INGUINAL HERNIA WITHOUT OBSTRUCTION OR GANGRENE: ICD-10-CM

## 2023-06-19 PROCEDURE — 76882 US LMTD JT/FCL EVL NVASC XTR: CPT | Mod: TC,RT

## 2023-06-19 PROCEDURE — 76882 US SOFT TISSUE, GROIN RIGHT: ICD-10-PCS | Mod: 26,RT,, | Performed by: RADIOLOGY

## 2023-06-19 PROCEDURE — 76882 US LMTD JT/FCL EVL NVASC XTR: CPT | Mod: 26,RT,, | Performed by: RADIOLOGY

## 2023-09-25 ENCOUNTER — HOSPITAL ENCOUNTER (EMERGENCY)
Facility: HOSPITAL | Age: 28
Discharge: HOME | End: 2023-09-25
Attending: EMERGENCY MEDICINE
Payer: COMMERCIAL

## 2023-09-25 VITALS
WEIGHT: 170 LBS | DIASTOLIC BLOOD PRESSURE: 79 MMHG | BODY MASS INDEX: 22.53 KG/M2 | RESPIRATION RATE: 16 BRPM | TEMPERATURE: 98 F | SYSTOLIC BLOOD PRESSURE: 158 MMHG | HEART RATE: 59 BPM | OXYGEN SATURATION: 100 % | HEIGHT: 73 IN

## 2023-09-25 DIAGNOSIS — M25.511 RIGHT SHOULDER PAIN: ICD-10-CM
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
## 2023-09-25 DIAGNOSIS — M25.561 RIGHT KNEE PAIN: ICD-10-CM

## 2023-09-25 PROCEDURE — 63600175 PHARM REV CODE 636 W HCPCS: Performed by: EMERGENCY MEDICINE

## 2023-09-25 PROCEDURE — 99285 EMERGENCY DEPT VISIT HI MDM: CPT | Mod: 25

## 2023-09-25 PROCEDURE — 96372 THER/PROPH/DIAG INJ SC/IM: CPT | Performed by: EMERGENCY MEDICINE

## 2023-09-25 RX ORDER — LIDOCAINE 50 MG/G
1 PATCH TOPICAL DAILY
Qty: 15 PATCH | Refills: 0 | Status: SHIPPED | OUTPATIENT
Start: 2023-09-25

## 2023-09-25 RX ORDER — IBUPROFEN 400 MG/1
600 TABLET ORAL EVERY 6 HOURS PRN
Qty: 20 TABLET | Refills: 0 | Status: SHIPPED | OUTPATIENT
Start: 2023-09-25

## 2023-09-25 RX ORDER — CYCLOBENZAPRINE HCL 10 MG
10 TABLET ORAL 3 TIMES DAILY PRN
Qty: 15 TABLET | Refills: 0 | Status: SHIPPED | OUTPATIENT
Start: 2023-09-25 | End: 2023-09-30

## 2023-09-25 RX ORDER — KETOROLAC TROMETHAMINE 30 MG/ML
30 INJECTION, SOLUTION INTRAMUSCULAR; INTRAVENOUS
Status: COMPLETED | OUTPATIENT
Start: 2023-09-25 | End: 2023-09-25

## 2023-09-25 RX ADMIN — KETOROLAC TROMETHAMINE 30 MG: 30 INJECTION, SOLUTION INTRAMUSCULAR; INTRAVENOUS at 05:09
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
## 2023-09-25 NOTE — Clinical Note
"Dc"Gustavo Taylor was seen and treated in our emergency department on 9/25/2023.  He may return to work on 10/02/2023.       If you have any questions or concerns, please don't hesitate to call.      Gary Lugo MD"
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
## 2023-10-01 ENCOUNTER — HOSPITAL ENCOUNTER (EMERGENCY)
Facility: HOSPITAL | Age: 28
Discharge: HOME | End: 2023-10-01
Attending: EMERGENCY MEDICINE

## 2023-10-01 VITALS
DIASTOLIC BLOOD PRESSURE: 78 MMHG | RESPIRATION RATE: 18 BRPM | WEIGHT: 170 LBS | HEART RATE: 68 BPM | TEMPERATURE: 98 F | HEIGHT: 73 IN | SYSTOLIC BLOOD PRESSURE: 130 MMHG | BODY MASS INDEX: 22.53 KG/M2 | OXYGEN SATURATION: 100 %

## 2023-10-01 DIAGNOSIS — R51.9 FRONTAL HEADACHE: Primary | ICD-10-CM

## 2023-10-01 PROCEDURE — 96375 TX/PRO/DX INJ NEW DRUG ADDON: CPT

## 2023-10-01 PROCEDURE — 63600175 PHARM REV CODE 636 W HCPCS

## 2023-10-01 PROCEDURE — 96374 THER/PROPH/DIAG INJ IV PUSH: CPT

## 2023-10-01 PROCEDURE — 99285 EMERGENCY DEPT VISIT HI MDM: CPT | Mod: 25

## 2023-10-01 RX ORDER — KETOROLAC TROMETHAMINE 30 MG/ML
15 INJECTION, SOLUTION INTRAMUSCULAR; INTRAVENOUS
Status: COMPLETED | OUTPATIENT
Start: 2023-10-01 | End: 2023-10-01

## 2023-10-01 RX ORDER — DIPHENHYDRAMINE HYDROCHLORIDE 50 MG/ML
12.5 INJECTION INTRAMUSCULAR; INTRAVENOUS
Status: COMPLETED | OUTPATIENT
Start: 2023-10-01 | End: 2023-10-01

## 2023-10-01 RX ORDER — PROCHLORPERAZINE EDISYLATE 5 MG/ML
2.5 INJECTION INTRAMUSCULAR; INTRAVENOUS EVERY 6 HOURS PRN
Status: DISCONTINUED | OUTPATIENT
Start: 2023-10-01 | End: 2023-10-01 | Stop reason: HOSPADM

## 2023-10-01 RX ORDER — IBUPROFEN 600 MG/1
600 TABLET ORAL EVERY 6 HOURS PRN
Qty: 20 TABLET | Refills: 0 | Status: SHIPPED | OUTPATIENT
Start: 2023-10-01

## 2023-10-01 RX ADMIN — KETOROLAC TROMETHAMINE 15 MG: 30 INJECTION, SOLUTION INTRAMUSCULAR; INTRAVENOUS at 12:10

## 2023-10-01 RX ADMIN — PROCHLORPERAZINE EDISYLATE 2.5 MG: 5 INJECTION INTRAMUSCULAR; INTRAVENOUS at 11:10

## 2023-10-01 RX ADMIN — DIPHENHYDRAMINE HYDROCHLORIDE 12.5 MG: 50 INJECTION, SOLUTION INTRAMUSCULAR; INTRAVENOUS at 11:10

## 2023-10-01 NOTE — ED TRIAGE NOTES
Pt. Reports he has headache to the right side of his frontal lobe and neck pain.    Azelaic Acid Counseling: Patient counseled that medicine may cause skin irritation and to avoid applying near the eyes.  In the event of skin irritation, the patient was advised to reduce the amount of the drug applied or use it less frequently.   The patient verbalized understanding of the proper use and possible adverse effects of azelaic acid.  All of the patient's questions and concerns were addressed.

## 2023-10-01 NOTE — Clinical Note
"Dc"Gustavo Taylor was seen and treated in our emergency department on 10/1/2023.  He may return to work on 10/04/2023.  Patient was seen and cared for today in the emergency department and may return to work by 10/4 or sooner if symptoms resolve.      If you have any questions or concerns, please don't hesitate to call.      Micheal Sapp PA-C"

## 2023-10-01 NOTE — DISCHARGE INSTRUCTIONS
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
